# Patient Record
Sex: MALE | Race: WHITE | Employment: FULL TIME | URBAN - NONMETROPOLITAN AREA
[De-identification: names, ages, dates, MRNs, and addresses within clinical notes are randomized per-mention and may not be internally consistent; named-entity substitution may affect disease eponyms.]

---

## 2018-01-11 ENCOUNTER — HOSPITAL ENCOUNTER (EMERGENCY)
Age: 48
Discharge: HOME | End: 2018-01-12
Payer: COMMERCIAL

## 2018-01-11 DIAGNOSIS — R10.10 PAIN OF UPPER ABDOMEN: Primary | ICD-10-CM

## 2018-01-11 PROCEDURE — 99284 EMERGENCY DEPT VISIT MOD MDM: CPT

## 2018-01-12 ENCOUNTER — APPOINTMENT (OUTPATIENT)
Dept: CT IMAGING | Age: 48
End: 2018-01-12
Payer: COMMERCIAL

## 2018-01-12 VITALS
BODY MASS INDEX: 24.11 KG/M2 | OXYGEN SATURATION: 94 % | HEART RATE: 80 BPM | WEIGHT: 168 LBS | RESPIRATION RATE: 18 BRPM | SYSTOLIC BLOOD PRESSURE: 130 MMHG | DIASTOLIC BLOOD PRESSURE: 95 MMHG | TEMPERATURE: 98.2 F

## 2018-01-12 LAB
ALBUMIN SERPL-MCNC: 4.3 G/DL (ref 3.5–5.1)
ALP BLD-CCNC: 73 U/L (ref 38–126)
ALT SERPL-CCNC: 23 U/L (ref 11–66)
ANION GAP SERPL CALCULATED.3IONS-SCNC: 12 MEQ/L (ref 8–16)
AST SERPL-CCNC: 17 U/L (ref 5–40)
BACTERIA: NORMAL
BASOPHILS # BLD: 0.7 %
BASOPHILS ABSOLUTE: 0.1 THOU/MM3 (ref 0–0.1)
BILIRUB SERPL-MCNC: 0.4 MG/DL (ref 0.3–1.2)
BILIRUBIN URINE: NEGATIVE
BLOOD, URINE: NEGATIVE
BUN BLDV-MCNC: 9 MG/DL (ref 7–22)
CALCIUM SERPL-MCNC: 9 MG/DL (ref 8.5–10.5)
CASTS: NORMAL /LPF
CASTS: NORMAL /LPF
CHARACTER, URINE: CLEAR
CHLORIDE BLD-SCNC: 104 MEQ/L (ref 98–111)
CO2: 27 MEQ/L (ref 23–33)
COLOR: YELLOW
CREAT SERPL-MCNC: 0.8 MG/DL (ref 0.4–1.2)
CRYSTALS: NORMAL
EOSINOPHIL # BLD: 3.5 %
EOSINOPHILS ABSOLUTE: 0.3 THOU/MM3 (ref 0–0.4)
EPITHELIAL CELLS, UA: NORMAL /HPF
GFR SERPL CREATININE-BSD FRML MDRD: > 90 ML/MIN/1.73M2
GLUCOSE BLD-MCNC: 106 MG/DL (ref 70–108)
GLUCOSE, URINE: NEGATIVE MG/DL
HCT VFR BLD CALC: 44.7 % (ref 42–52)
HEMOGLOBIN: 15.3 GM/DL (ref 14–18)
KETONES, URINE: NEGATIVE
LACTIC ACID: 0.9 MMOL/L (ref 0.5–2.2)
LEUKOCYTE ESTERASE, URINE: NEGATIVE
LIPASE: 22.9 U/L (ref 5.6–51.3)
LYMPHOCYTES # BLD: 28.3 %
LYMPHOCYTES ABSOLUTE: 2.3 THOU/MM3 (ref 1–4.8)
MCH RBC QN AUTO: 30 PG (ref 27–31)
MCHC RBC AUTO-ENTMCNC: 34.1 GM/DL (ref 33–37)
MCV RBC AUTO: 87.9 FL (ref 80–94)
MISCELLANEOUS LAB TEST RESULT: NORMAL
MONOCYTES # BLD: 11.4 %
MONOCYTES ABSOLUTE: 0.9 THOU/MM3 (ref 0.4–1.3)
NITRITE, URINE: NEGATIVE
NUCLEATED RED BLOOD CELLS: 0 /100 WBC
OSMOLALITY CALCULATION: 284.1 MOSMOL/KG (ref 275–300)
PDW BLD-RTO: 13.9 % (ref 11.5–14.5)
PH UA: 6.5
PLATELET # BLD: 283 THOU/MM3 (ref 130–400)
PMV BLD AUTO: 8 MCM (ref 7.4–10.4)
POTASSIUM SERPL-SCNC: 3.8 MEQ/L (ref 3.5–5.2)
PROTEIN UA: NEGATIVE MG/DL
RBC # BLD: 5.09 MILL/MM3 (ref 4.7–6.1)
RBC URINE: NORMAL /HPF
RENAL EPITHELIAL, UA: NORMAL
SEG NEUTROPHILS: 56.1 %
SEGMENTED NEUTROPHILS ABSOLUTE COUNT: 4.6 THOU/MM3 (ref 1.8–7.7)
SODIUM BLD-SCNC: 143 MEQ/L (ref 135–145)
SPECIFIC GRAVITY UA: 1.02 (ref 1–1.03)
TOTAL PROTEIN: 6.9 G/DL (ref 6.1–8)
UROBILINOGEN, URINE: 1 EU/DL
WBC # BLD: 8.2 THOU/MM3 (ref 4.8–10.8)
WBC UA: NORMAL /HPF
YEAST: NORMAL

## 2018-01-12 PROCEDURE — 74177 CT ABD & PELVIS W/CONTRAST: CPT

## 2018-01-12 PROCEDURE — 81001 URINALYSIS AUTO W/SCOPE: CPT

## 2018-01-12 PROCEDURE — 83605 ASSAY OF LACTIC ACID: CPT

## 2018-01-12 PROCEDURE — 85025 COMPLETE CBC W/AUTO DIFF WBC: CPT

## 2018-01-12 PROCEDURE — 2580000003 HC RX 258: Performed by: PHYSICIAN ASSISTANT

## 2018-01-12 PROCEDURE — 36415 COLL VENOUS BLD VENIPUNCTURE: CPT

## 2018-01-12 PROCEDURE — 80053 COMPREHEN METABOLIC PANEL: CPT

## 2018-01-12 PROCEDURE — 6360000004 HC RX CONTRAST MEDICATION: Performed by: PHYSICIAN ASSISTANT

## 2018-01-12 PROCEDURE — 83690 ASSAY OF LIPASE: CPT

## 2018-01-12 RX ORDER — DICYCLOMINE HYDROCHLORIDE 10 MG/1
10 CAPSULE ORAL
Qty: 120 CAPSULE | Refills: 3 | Status: SHIPPED | OUTPATIENT
Start: 2018-01-12

## 2018-01-12 RX ORDER — 0.9 % SODIUM CHLORIDE 0.9 %
500 INTRAVENOUS SOLUTION INTRAVENOUS ONCE
Status: COMPLETED | OUTPATIENT
Start: 2018-01-12 | End: 2018-01-12

## 2018-01-12 RX ADMIN — IOPAMIDOL 80 ML: 755 INJECTION, SOLUTION INTRAVENOUS at 04:04

## 2018-01-12 RX ADMIN — SODIUM CHLORIDE 500 ML: 9 INJECTION, SOLUTION INTRAVENOUS at 02:21

## 2018-01-12 ASSESSMENT — ENCOUNTER SYMPTOMS
SORE THROAT: 0
SHORTNESS OF BREATH: 0
VOMITING: 0
ABDOMINAL PAIN: 1
DIARRHEA: 0
COUGH: 0
EYE REDNESS: 0
RHINORRHEA: 0
BACK PAIN: 0
WHEEZING: 0
EYE DISCHARGE: 0
NAUSEA: 0

## 2018-01-12 ASSESSMENT — PAIN DESCRIPTION - PAIN TYPE: TYPE: ACUTE PAIN

## 2018-01-12 ASSESSMENT — PAIN SCALES - GENERAL: PAINLEVEL_OUTOF10: 5

## 2018-01-12 NOTE — ED NOTES
First dose oral contrast drank without difficulty. Patient updated on plan of care.      Kei Bradford RN  01/12/18 3401

## 2018-01-21 PROBLEM — K43.9 VENTRAL HERNIA WITHOUT OBSTRUCTION OR GANGRENE: Status: ACTIVE | Noted: 2018-01-21

## 2018-01-21 PROBLEM — K42.9 UMBILICAL HERNIA WITHOUT OBSTRUCTION AND WITHOUT GANGRENE: Status: ACTIVE | Noted: 2018-01-21

## 2018-01-22 ENCOUNTER — TELEPHONE (OUTPATIENT)
Dept: SURGERY | Age: 48
End: 2018-01-22

## 2018-01-22 ENCOUNTER — OFFICE VISIT (OUTPATIENT)
Dept: SURGERY | Age: 48
End: 2018-01-22
Payer: COMMERCIAL

## 2018-01-22 VITALS
DIASTOLIC BLOOD PRESSURE: 64 MMHG | HEIGHT: 70 IN | HEART RATE: 76 BPM | RESPIRATION RATE: 18 BRPM | BODY MASS INDEX: 25.3 KG/M2 | TEMPERATURE: 97 F | WEIGHT: 176.7 LBS | SYSTOLIC BLOOD PRESSURE: 118 MMHG | OXYGEN SATURATION: 97 %

## 2018-01-22 DIAGNOSIS — K43.9 VENTRAL HERNIA WITHOUT OBSTRUCTION OR GANGRENE: ICD-10-CM

## 2018-01-22 DIAGNOSIS — K42.9 UMBILICAL HERNIA WITHOUT OBSTRUCTION AND WITHOUT GANGRENE: ICD-10-CM

## 2018-01-22 PROCEDURE — 99203 OFFICE O/P NEW LOW 30 MIN: CPT | Performed by: SURGERY

## 2018-01-22 RX ORDER — COVID-19 ANTIGEN TEST
1 KIT MISCELLANEOUS PRN
COMMUNITY

## 2018-01-22 ASSESSMENT — ENCOUNTER SYMPTOMS
FACIAL SWELLING: 0
CHEST TIGHTNESS: 0
BACK PAIN: 1
EYE DISCHARGE: 0
CONSTIPATION: 1
EYE ITCHING: 0
ANAL BLEEDING: 0
EYE PAIN: 0
NAUSEA: 0
STRIDOR: 0
SINUS PRESSURE: 0
RECTAL PAIN: 0
COUGH: 0
VOMITING: 0
EYE REDNESS: 0
TROUBLE SWALLOWING: 0
SORE THROAT: 0
APNEA: 0
DIARRHEA: 0
BLOOD IN STOOL: 0
PHOTOPHOBIA: 0
ABDOMINAL DISTENTION: 1
SHORTNESS OF BREATH: 0
VOICE CHANGE: 0
RHINORRHEA: 0
SINUS PAIN: 0
WHEEZING: 0
COLOR CHANGE: 0
ABDOMINAL PAIN: 1
CHOKING: 0

## 2018-01-22 NOTE — TELEPHONE ENCOUNTER
Scheduled Tod Sames for a ventral & umbilical hernia on Thurs 1/25 at 11:15 & he will arrive at 9:45. Tod Sames will be npo after midnight & consent was signed.

## 2018-01-22 NOTE — TELEPHONE ENCOUNTER
Per automated system at [facility] no pre-cert is required for CPT code [phone] & CPT code [phone] #[id number].

## 2018-01-22 NOTE — PROGRESS NOTES
Prior to Visit   Medication Sig Dispense Refill    dicyclomine (BENTYL) 10 MG capsule Take 1 capsule by mouth 4 times daily (before meals and nightly) 120 capsule 3     No current facility-administered medications on file prior to visit. Allergies  Allergies   Allergen Reactions    Beef-Derived Products Swelling    Motrin [Ibuprofen]      Can take aleve     Family History  Family History   Problem Relation Age of Onset    Hypertension Mother     Diabetes Mother     Cancer Mother      Breast    Heart Disease Mother     Cancer Sister      breast     Social History  Social History     Social History    Marital status: Single     Spouse name: N/A    Number of children: N/A    Years of education: N/A     Occupational History    Not on file. Social History Main Topics    Smoking status: Never Smoker    Smokeless tobacco: Current User     Types: Chew    Alcohol use Yes      Comment: 1-2 beers a night    Drug use: No    Sexual activity: Not on file     Other Topics Concern    Not on file     Social History Narrative    No narrative on file     [address] Maintenance   Topic Date Due    HIV screen  01/10/1985    DTaP/Tdap/Td vaccine (1 - Tdap) 01/10/1989    Flu vaccine (1) 09/01/2017    A1C test (Diabetic or Prediabetic)  01/17/2019    Lipid screen  01/17/2023     Review of Systems  Constitutional: Negative for activity change, appetite change, chills, diaphoresis, fatigue, fever and unexpected weight change. HENT: Negative for congestion, dental problem, drooling, ear discharge, ear pain, facial swelling, hearing loss, mouth sores, nosebleeds, postnasal drip, rhinorrhea, sinus pain, sinus pressure, sneezing, sore throat, tinnitus, trouble swallowing and voice change. Eyes: Negative for photophobia, pain, discharge, redness, itching and visual disturbance. Respiratory: Negative for apnea, cough, choking, chest tightness, shortness of breath, wheezing and stridor.

## 2018-01-22 NOTE — LETTER
[facility]  Jacquelyn Corado MD [facility]  Phone- [phone]  Fax [phone]    Pt Name: Noah Zamudio  Medical Record Number: [id number]  Date of Birth 1970   Today's Date: 1/22/2018    Moni Gaona was evaluated in the office today. My assessment and plans are listed below. Assessment:     Andres Jackson was seen today for surgical consult. Diagnoses and all orders for this visit:    Ventral hernia without obstruction or gangrene  -     REPAIR HERNIA VENTRAL / Sharia Jonnie; Future    Umbilical hernia without obstruction and without gangrene  -     REPAIR HERNIA VENTRAL / INCISIONAL; Future  -     REPAIR HERNIA UMBILICAL; Future  -     MN IMPLANT MESH HERNIA REPAIR/DEBRIDEMENT CLOSURE         Plan:     1. Schedule Joo for ventral and umbilical hernia repair  2. The risks, options and alternatives were discussed in the office. Bleeding, infection, reoperation and recurrence were discussed. Mesh infection specifically  was discussed, and the possible treatment options were discussed. The possibility of inadvertent injury to other structures was also covered. The patient was given opportunity to ask questions. Once answered, the patient has agreed to proceed with surgery. (OPEN)  3. The risks, options and alternatives were discussed in the office. Bleeding, infection, reoperation and recurrence were discussed. Injury to other intra abdominal structures and possible conversion to an open procedure as well as the risks of mesh infection were covered. The patient was given the opportunity to ask questions. Once answered, the patient was agreeable to proceed with the surgery. (L/S)  4. Status: outpatient  5. Planned anesthesia: MAC  6.  He will undergo pre-operative clearance per anesthesia guidelines with risk factors listed under the past medical history diagnosis & problem list.  7. Perioperative discontinuation of ASA, Plavix, warfarin, Brillinta, Effient, Pradaxa, Eliquis and Xarelto. Continuation of 81 mg Aspirin is acceptable. 8. Perioperative medical clearance is not required  Orders Placed This Encounter   Procedures    REPAIR HERNIA VENTRAL / INCISIONAL     Standing Status:   Future     Standing Expiration Date:   1/22/2019     Order Specific Question:   Pre-procedure Diagnosis     Answer:   INCISIONAL HERNIA REPAIR    REPAIR HERNIA UMBILICAL     Standing Status:   Future     Standing Expiration Date:   1/22/2019     Order Specific Question:   Pre-procedure Diagnosis     Answer:   UMBILICAL HERNIA    NV IMPLANT MESH HERNIA REPAIR/DEBRIDEMENT CLOSURE                   If I can provide any additional assistance or you have any concerns, please feel free to contact me. Thank you for allowing to participate in the care of your patients. Sincerely,      Anne Rutledge MD FACS  [address]  [address]  [place]  Office: [phone]  Fax: [phone]

## 2018-01-23 NOTE — PROGRESS NOTES
In preparation for their surgical procedure above patient was screened for Obstructive Sleep Apnea (ANGELLA) using the STOP-Bang Questionnaire by the Pre-Admission Testing department. This is a pre-surgical screening tool for patient safety and serves as a recommendation, this WILL NOT cause cancellation of surgery. STOP-Bang Questionnaire  * Do you currently see a pulmonologist?  No     If yes STOP, do not complete. Patient follows with  .    1. Do you snore loudly (able to be heard in the next room)? Yes    2. Do you often feel tired or sleepy during the daytime? No       3. Has anyone ever told you that you stop breathing during your sleep? No    4. Do you have or are you being treated for high blood pressure? No      5. BMI more than 35? BMI (Calculated): 25.3        No    6. Age over 48 years? 50 y.o. No    7. Neck Circumference greater than 17 inches for male or 16 inches for female? Measured           (visits only)            Not Applicable    8. Gender Male? Yes      TOTAL SCORE: 2    ANGELLA - Low Risk : Yes to 0 - 2 questions  ANGELLA - Intermediate Risk : Yes to 3 - 4 questions  ANGELLA - High Risk : Yes to 5 - 8 questions    Adapted from:   STOP Questionnaire: A Tool to Screen Patients for Obstructive Sleep Apnea   [facility] Arteaga[employer]P[facility]C., Monica Zamarripa M.B.B.S., Chago Chaparro M.D., Luis Alberto Palomares. Chris Thompson, Ph.D., ALVARO Reyes.B.B.S., Jose Solano, M.Sc., Stella Edmonds M.D., La Robertson. [facility] Kramer[employer]P[facility]    Anesthesiology 2008; [id number] Copyright 2008, the [address] of [employer], [place].   ----------------------------------------------------------------------------------------------------------------

## 2018-01-25 ENCOUNTER — APPOINTMENT (OUTPATIENT)
Dept: CT IMAGING | Age: 48
End: 2018-01-25
Payer: COMMERCIAL

## 2018-01-25 ENCOUNTER — ANESTHESIA (OUTPATIENT)
Dept: OPERATING ROOM | Age: 48
End: 2018-01-25
Payer: COMMERCIAL

## 2018-01-25 ENCOUNTER — HOSPITAL ENCOUNTER (OUTPATIENT)
Age: 48
Setting detail: OUTPATIENT SURGERY
Discharge: HOME | End: 2018-01-25
Attending: SURGERY | Admitting: SURGERY
Payer: COMMERCIAL

## 2018-01-25 ENCOUNTER — APPOINTMENT (OUTPATIENT)
Dept: GENERAL RADIOLOGY | Age: 48
End: 2018-01-25
Payer: COMMERCIAL

## 2018-01-25 ENCOUNTER — ANESTHESIA EVENT (OUTPATIENT)
Dept: OPERATING ROOM | Age: 48
End: 2018-01-25
Payer: COMMERCIAL

## 2018-01-25 ENCOUNTER — HOSPITAL ENCOUNTER (EMERGENCY)
Age: 48
Discharge: HOME | End: 2018-01-25
Attending: INTERNAL MEDICINE
Payer: COMMERCIAL

## 2018-01-25 VITALS
WEIGHT: 172 LBS | HEART RATE: 60 BPM | BODY MASS INDEX: 24.62 KG/M2 | SYSTOLIC BLOOD PRESSURE: 104 MMHG | RESPIRATION RATE: 16 BRPM | TEMPERATURE: 98.1 F | HEIGHT: 70 IN | OXYGEN SATURATION: 99 % | DIASTOLIC BLOOD PRESSURE: 58 MMHG

## 2018-01-25 VITALS
OXYGEN SATURATION: 95 % | RESPIRATION RATE: 20 BRPM | SYSTOLIC BLOOD PRESSURE: 96 MMHG | DIASTOLIC BLOOD PRESSURE: 62 MMHG

## 2018-01-25 VITALS
TEMPERATURE: 97.8 F | OXYGEN SATURATION: 97 % | WEIGHT: 162 LBS | HEIGHT: 70 IN | HEART RATE: 56 BPM | RESPIRATION RATE: 14 BRPM | DIASTOLIC BLOOD PRESSURE: 71 MMHG | BODY MASS INDEX: 23.19 KG/M2 | SYSTOLIC BLOOD PRESSURE: 101 MMHG

## 2018-01-25 DIAGNOSIS — K43.9 VENTRAL HERNIA WITHOUT OBSTRUCTION OR GANGRENE: ICD-10-CM

## 2018-01-25 DIAGNOSIS — Z98.890 S/P REPAIR OF VENTRAL HERNIA: Primary | ICD-10-CM

## 2018-01-25 DIAGNOSIS — Z87.19 S/P REPAIR OF VENTRAL HERNIA: Primary | ICD-10-CM

## 2018-01-25 DIAGNOSIS — R55 SYNCOPE, UNSPECIFIED SYNCOPE TYPE: Primary | ICD-10-CM

## 2018-01-25 LAB
ANION GAP SERPL CALCULATED.3IONS-SCNC: 14 MEQ/L (ref 8–16)
BASOPHILS # BLD: 0.8 %
BASOPHILS ABSOLUTE: 0.1 THOU/MM3 (ref 0–0.1)
BUN BLDV-MCNC: 8 MG/DL (ref 7–22)
CALCIUM SERPL-MCNC: 8.7 MG/DL (ref 8.5–10.5)
CHLORIDE BLD-SCNC: 103 MEQ/L (ref 98–111)
CO2: 24 MEQ/L (ref 23–33)
CREAT SERPL-MCNC: 0.9 MG/DL (ref 0.4–1.2)
EKG ATRIAL RATE: 54 BPM
EKG P AXIS: 21 DEGREES
EKG P-R INTERVAL: 122 MS
EKG Q-T INTERVAL: 448 MS
EKG QRS DURATION: 98 MS
EKG QTC CALCULATION (BAZETT): 424 MS
EKG R AXIS: 16 DEGREES
EKG T AXIS: 7 DEGREES
EKG VENTRICULAR RATE: 54 BPM
EOSINOPHIL # BLD: 3.7 %
EOSINOPHILS ABSOLUTE: 0.3 THOU/MM3 (ref 0–0.4)
GFR SERPL CREATININE-BSD FRML MDRD: > 90 ML/MIN/1.73M2
GLUCOSE BLD-MCNC: 108 MG/DL (ref 70–108)
GLUCOSE BLD-MCNC: 137 MG/DL (ref 70–108)
HCT VFR BLD CALC: 41.7 % (ref 42–52)
HEMOGLOBIN: 14.4 GM/DL (ref 14–18)
LYMPHOCYTES # BLD: 18.4 %
LYMPHOCYTES ABSOLUTE: 1.4 THOU/MM3 (ref 1–4.8)
MCH RBC QN AUTO: 30 PG (ref 27–31)
MCHC RBC AUTO-ENTMCNC: 34.6 GM/DL (ref 33–37)
MCV RBC AUTO: 86.7 FL (ref 80–94)
MONOCYTES # BLD: 10.9 %
MONOCYTES ABSOLUTE: 0.8 THOU/MM3 (ref 0.4–1.3)
NUCLEATED RED BLOOD CELLS: 0 /100 WBC
OSMOLALITY CALCULATION: 281.7 MOSMOL/KG (ref 275–300)
PDW BLD-RTO: 13.6 % (ref 11.5–14.5)
PLATELET # BLD: 267 THOU/MM3 (ref 130–400)
PMV BLD AUTO: 8.1 MCM (ref 7.4–10.4)
POTASSIUM SERPL-SCNC: 4 MEQ/L (ref 3.5–5.2)
RBC # BLD: 4.8 MILL/MM3 (ref 4.7–6.1)
SEG NEUTROPHILS: 66.2 %
SEGMENTED NEUTROPHILS ABSOLUTE COUNT: 5 THOU/MM3 (ref 1.8–7.7)
SODIUM BLD-SCNC: 141 MEQ/L (ref 135–145)
TROPONIN T: < 0.01 NG/ML
WBC # BLD: 7.5 THOU/MM3 (ref 4.8–10.8)

## 2018-01-25 PROCEDURE — 71046 X-RAY EXAM CHEST 2 VIEWS: CPT

## 2018-01-25 PROCEDURE — 3700000000 HC ANESTHESIA ATTENDED CARE: Performed by: SURGERY

## 2018-01-25 PROCEDURE — 2580000003 HC RX 258: Performed by: SURGERY

## 2018-01-25 PROCEDURE — 99284 EMERGENCY DEPT VISIT MOD MDM: CPT

## 2018-01-25 PROCEDURE — 49568 PR IMPLANT MESH HERNIA REPAIR/DEBRIDEMENT CLOSURE: CPT | Performed by: SURGERY

## 2018-01-25 PROCEDURE — 3600000003 HC SURGERY LEVEL 3 BASE: Performed by: SURGERY

## 2018-01-25 PROCEDURE — 84484 ASSAY OF TROPONIN QUANT: CPT

## 2018-01-25 PROCEDURE — 6360000002 HC RX W HCPCS: Performed by: NURSE ANESTHETIST, CERTIFIED REGISTERED

## 2018-01-25 PROCEDURE — 6360000002 HC RX W HCPCS: Performed by: SURGERY

## 2018-01-25 PROCEDURE — 7100000010 HC PHASE II RECOVERY - FIRST 15 MIN: Performed by: SURGERY

## 2018-01-25 PROCEDURE — 6370000000 HC RX 637 (ALT 250 FOR IP): Performed by: SURGERY

## 2018-01-25 PROCEDURE — 3600000013 HC SURGERY LEVEL 3 ADDTL 15MIN: Performed by: SURGERY

## 2018-01-25 PROCEDURE — 36415 COLL VENOUS BLD VENIPUNCTURE: CPT

## 2018-01-25 PROCEDURE — 82948 REAGENT STRIP/BLOOD GLUCOSE: CPT

## 2018-01-25 PROCEDURE — 70450 CT HEAD/BRAIN W/O DYE: CPT

## 2018-01-25 PROCEDURE — 49587 REPAIR UMBILICAL HERN,5+Y/O,STRANG: CPT | Performed by: SURGERY

## 2018-01-25 PROCEDURE — 2580000003 HC RX 258: Performed by: INTERNAL MEDICINE

## 2018-01-25 PROCEDURE — 93005 ELECTROCARDIOGRAM TRACING: CPT

## 2018-01-25 PROCEDURE — 7100000011 HC PHASE II RECOVERY - ADDTL 15 MIN: Performed by: SURGERY

## 2018-01-25 PROCEDURE — 49560 PR REPAIR INCISIONAL HERNIA,REDUCIBLE: CPT | Performed by: SURGERY

## 2018-01-25 PROCEDURE — 6360000002 HC RX W HCPCS

## 2018-01-25 PROCEDURE — 2500000003 HC RX 250 WO HCPCS: Performed by: SURGERY

## 2018-01-25 PROCEDURE — 85025 COMPLETE CBC W/AUTO DIFF WBC: CPT

## 2018-01-25 PROCEDURE — C1781 MESH (IMPLANTABLE): HCPCS | Performed by: SURGERY

## 2018-01-25 PROCEDURE — 80048 BASIC METABOLIC PNL TOTAL CA: CPT

## 2018-01-25 PROCEDURE — 3700000001 HC ADD 15 MINUTES (ANESTHESIA): Performed by: SURGERY

## 2018-01-25 DEVICE — PATCH HERN M DIA2.5IN CIR W/ STRP SEPRA TECHNOLOGY ABSRB: Type: IMPLANTABLE DEVICE | Status: FUNCTIONAL

## 2018-01-25 DEVICE — IMPLANTABLE DEVICE: Type: IMPLANTABLE DEVICE | Status: FUNCTIONAL

## 2018-01-25 RX ORDER — MORPHINE SULFATE 2 MG/ML
4 INJECTION, SOLUTION INTRAMUSCULAR; INTRAVENOUS
Status: DISCONTINUED | OUTPATIENT
Start: 2018-01-25 | End: 2018-01-25 | Stop reason: HOSPADM

## 2018-01-25 RX ORDER — HYDROCODONE BITARTRATE AND ACETAMINOPHEN 5; 325 MG/1; MG/1
2 TABLET ORAL EVERY 4 HOURS PRN
Status: DISCONTINUED | OUTPATIENT
Start: 2018-01-25 | End: 2018-01-25 | Stop reason: HOSPADM

## 2018-01-25 RX ORDER — SODIUM CHLORIDE 0.9 % (FLUSH) 0.9 %
10 SYRINGE (ML) INJECTION PRN
Status: DISCONTINUED | OUTPATIENT
Start: 2018-01-25 | End: 2018-01-25 | Stop reason: HOSPADM

## 2018-01-25 RX ORDER — ACETAMINOPHEN 325 MG/1
650 TABLET ORAL EVERY 4 HOURS PRN
Status: DISCONTINUED | OUTPATIENT
Start: 2018-01-25 | End: 2018-01-25 | Stop reason: HOSPADM

## 2018-01-25 RX ORDER — ONDANSETRON 2 MG/ML
4 INJECTION INTRAMUSCULAR; INTRAVENOUS EVERY 6 HOURS PRN
Status: DISCONTINUED | OUTPATIENT
Start: 2018-01-25 | End: 2018-01-25 | Stop reason: HOSPADM

## 2018-01-25 RX ORDER — MIDAZOLAM HYDROCHLORIDE 1 MG/ML
INJECTION INTRAMUSCULAR; INTRAVENOUS PRN
Status: DISCONTINUED | OUTPATIENT
Start: 2018-01-25 | End: 2018-01-25 | Stop reason: SDUPTHER

## 2018-01-25 RX ORDER — MORPHINE SULFATE 2 MG/ML
2 INJECTION, SOLUTION INTRAMUSCULAR; INTRAVENOUS
Status: DISCONTINUED | OUTPATIENT
Start: 2018-01-25 | End: 2018-01-25 | Stop reason: HOSPADM

## 2018-01-25 RX ORDER — HYDROCODONE BITARTRATE AND ACETAMINOPHEN 5; 325 MG/1; MG/1
1 TABLET ORAL EVERY 4 HOURS PRN
Status: DISCONTINUED | OUTPATIENT
Start: 2018-01-25 | End: 2018-01-25 | Stop reason: HOSPADM

## 2018-01-25 RX ORDER — PROPOFOL 10 MG/ML
INJECTION, EMULSION INTRAVENOUS PRN
Status: DISCONTINUED | OUTPATIENT
Start: 2018-01-25 | End: 2018-01-25 | Stop reason: SDUPTHER

## 2018-01-25 RX ORDER — FENTANYL CITRATE 50 UG/ML
INJECTION, SOLUTION INTRAMUSCULAR; INTRAVENOUS PRN
Status: DISCONTINUED | OUTPATIENT
Start: 2018-01-25 | End: 2018-01-25 | Stop reason: SDUPTHER

## 2018-01-25 RX ORDER — SODIUM CHLORIDE 9 MG/ML
INJECTION, SOLUTION INTRAVENOUS CONTINUOUS
Status: DISCONTINUED | OUTPATIENT
Start: 2018-01-25 | End: 2018-01-25 | Stop reason: HOSPADM

## 2018-01-25 RX ORDER — HYDROCODONE BITARTRATE AND ACETAMINOPHEN 5; 325 MG/1; MG/1
1 TABLET ORAL EVERY 4 HOURS PRN
Qty: 42 TABLET | Refills: 0 | Status: SHIPPED | OUTPATIENT
Start: 2018-01-25 | End: 2018-02-01

## 2018-01-25 RX ORDER — SODIUM CHLORIDE 0.9 % (FLUSH) 0.9 %
10 SYRINGE (ML) INJECTION EVERY 12 HOURS SCHEDULED
Status: DISCONTINUED | OUTPATIENT
Start: 2018-01-25 | End: 2018-01-25 | Stop reason: HOSPADM

## 2018-01-25 RX ORDER — 0.9 % SODIUM CHLORIDE 0.9 %
1000 INTRAVENOUS SOLUTION INTRAVENOUS ONCE
Status: COMPLETED | OUTPATIENT
Start: 2018-01-25 | End: 2018-01-25

## 2018-01-25 RX ADMIN — PROPOFOL 50 MG: 10 INJECTION, EMULSION INTRAVENOUS at 10:42

## 2018-01-25 RX ADMIN — PROPOFOL 20 MG: 10 INJECTION, EMULSION INTRAVENOUS at 10:58

## 2018-01-25 RX ADMIN — PROPOFOL 10 MG: 10 INJECTION, EMULSION INTRAVENOUS at 10:57

## 2018-01-25 RX ADMIN — FENTANYL CITRATE 50 MCG: 50 INJECTION INTRAMUSCULAR; INTRAVENOUS at 10:52

## 2018-01-25 RX ADMIN — HYDROCODONE BITARTRATE AND ACETAMINOPHEN 2 TABLET: 5; 325 TABLET ORAL at 11:56

## 2018-01-25 RX ADMIN — PROPOFOL 20 MG: 10 INJECTION, EMULSION INTRAVENOUS at 10:46

## 2018-01-25 RX ADMIN — SODIUM CHLORIDE 1000 ML: 9 INJECTION, SOLUTION INTRAVENOUS at 15:00

## 2018-01-25 RX ADMIN — SODIUM CHLORIDE: 9 INJECTION, SOLUTION INTRAVENOUS at 10:24

## 2018-01-25 RX ADMIN — PROPOFOL 20 MG: 10 INJECTION, EMULSION INTRAVENOUS at 10:52

## 2018-01-25 RX ADMIN — CEFAZOLIN SODIUM 2 G: 2 SOLUTION INTRAVENOUS at 10:41

## 2018-01-25 RX ADMIN — MIDAZOLAM HYDROCHLORIDE 2 MG: 1 INJECTION, SOLUTION INTRAMUSCULAR; INTRAVENOUS at 10:35

## 2018-01-25 RX ADMIN — FENTANYL CITRATE 50 MCG: 50 INJECTION INTRAMUSCULAR; INTRAVENOUS at 10:40

## 2018-01-25 RX ADMIN — PROPOFOL 20 MG: 10 INJECTION, EMULSION INTRAVENOUS at 10:49

## 2018-01-25 ASSESSMENT — PULMONARY FUNCTION TESTS
PIF_VALUE: 0

## 2018-01-25 ASSESSMENT — PAIN DESCRIPTION - PAIN TYPE
TYPE: ACUTE PAIN
TYPE: SURGICAL PAIN
TYPE: SURGICAL PAIN

## 2018-01-25 ASSESSMENT — ENCOUNTER SYMPTOMS
EYE REDNESS: 0
SHORTNESS OF BREATH: 0
EYE DISCHARGE: 0
DIARRHEA: 0
VOMITING: 0
ABDOMINAL PAIN: 1
NAUSEA: 0
COUGH: 0
SORE THROAT: 0
RHINORRHEA: 0
BACK PAIN: 0
WHEEZING: 0

## 2018-01-25 ASSESSMENT — PAIN DESCRIPTION - ORIENTATION
ORIENTATION: MID

## 2018-01-25 ASSESSMENT — PAIN DESCRIPTION - LOCATION
LOCATION: ABDOMEN

## 2018-01-25 ASSESSMENT — PAIN DESCRIPTION - DESCRIPTORS
DESCRIPTORS: BURNING;SORE
DESCRIPTORS: BURNING;SORE
DESCRIPTORS: ACHING
DESCRIPTORS: BURNING;SORE

## 2018-01-25 ASSESSMENT — PAIN SCALES - GENERAL
PAINLEVEL_OUTOF10: 2
PAINLEVEL_OUTOF10: 7
PAINLEVEL_OUTOF10: 6
PAINLEVEL_OUTOF10: 7
PAINLEVEL_OUTOF10: 5
PAINLEVEL_OUTOF10: 3

## 2018-01-25 ASSESSMENT — PAIN DESCRIPTION - PROGRESSION: CLINICAL_PROGRESSION: GRADUALLY IMPROVING

## 2018-01-25 ASSESSMENT — PAIN DESCRIPTION - FREQUENCY: FREQUENCY: CONTINUOUS

## 2018-01-25 ASSESSMENT — PAIN DESCRIPTION - ONSET: ONSET: OTHER (COMMENT)

## 2018-01-25 NOTE — H&P
[facility]  History and Physical Update    Pt Name: Jillian Pretty  MRN: [id number]  YOB: 1970  Date of evaluation: 1/25/2018    [x] I have examined the patient and reviewed the H&P/Consult and there are no changes to the patient or plans.     [] I have examined the patient and reviewed the H&P/Consult and have noted the following changes:        Atiya Sr  Electronically signed 1/25/2018 at 10:09 AM

## 2018-01-25 NOTE — ANESTHESIA PRE PROCEDURE
Department of Anesthesiology  Preprocedure Note       Name:  Darrius Wiggins   Age:  50 y.o.  :  1970                                          MRN:  [id number]         Date:  2018      Surgeon: Isaiah Dodge):  Natalia Marsh MD    Procedure: Procedure(s):  VENTRAL HERNIA  AND UMBILICAL HERNIA REPAIR    Medications prior to admission:   Prior to Admission medications    Medication Sig Start Date End Date Taking? Authorizing Provider   Lactobacillus (ACIDOPHILUS PO) Take by mouth every morning   Yes Historical Provider, MD   Multiple Vitamin (MULTI-VITAMIN PO) Take by mouth every morning   Yes Historical Provider, MD   Naproxen Sodium (ALEVE) 220 MG CAPS Take 1 capsule by mouth as needed for Pain   Yes Historical Provider, MD   dicyclomine (BENTYL) 10 MG capsule Take 1 capsule by mouth 4 times daily (before meals and nightly) 18  Yes JAZ Calle       Current medications:    Current Facility-Administered Medications   Medication Dose Route Frequency Provider Last Rate Last Dose    0.9 % sodium chloride infusion   Intravenous Continuous Natalia Marsh MD        sodium chloride flush 0.9 % injection 10 mL  10 mL Intravenous 2 times per day Natalia Marsh MD        sodium chloride flush 0.9 % injection 10 mL  10 mL Intravenous PRN Natalia Marsh MD        ceFAZolin (ANCEF) 2 g in sterile water 20 mL IV syringe  2 g Intravenous On Call to MD Annalise           Allergies:     Allergies   Allergen Reactions    Beef-Derived Products Swelling    Motrin [Ibuprofen]      Cannot take due to glucose 6 phosphate deficiency, can take aleve    Other      Sodium Pentothal caused patient to \"become pale and passed out\"       Problem List:    Patient Active Problem List   Diagnosis Code    Ventral hernia without obstruction or gangrene X61.3    Umbilical hernia without obstruction and without gangrene K42.9       Past Medical History:        Diagnosis Date    DDD (degenerative disc disease), lumbar     G6PD deficiency ([facility] [address]     History of kidney stones     Wears dentures     Wears glasses        Past Surgical History:        Procedure Laterality Date    DENTAL SURGERY      full mouth extraction    KNEE SURGERY Right     x2-Dr Ana Luisa Mccarthyather Left     Dr Governor Boudreaux    OTHER SURGICAL HISTORY      Laceration repair of left tear duct-as a child    TONSILLECTOMY      as a child    TYMPANOSTOMY TUBE PLACEMENT      as a child       Social History:    Social History   Substance Use Topics    Smoking status: Never Smoker    Smokeless tobacco: Current User     Types: Chew    Alcohol use Yes      Comment: 1-2 beers a night                                Ready to quit: Not Answered  Counseling given: Not Answered      Vital Signs (Current):   Vitals:    01/23/18 1005 01/25/18 1003 01/25/18 1008   BP:  111/74    Pulse:  80    Resp:  16    Temp:  97.7 °F (36.5 °C)    TempSrc:  Temporal    SpO2:  95%    Weight: 176 lb (79.8 kg)  172 lb (78 kg)   Height: 5' 10\" (1.778 m)  5' 10\" (1.778 m)                                              BP Readings from Last 3 Encounters:   01/25/18 111/74   01/22/18 118/64   01/12/18 (!) 130/95       NPO Status: Time of last liquid consumption: 2100                        Time of last solid consumption: 2100                        Date of last liquid consumption: 01/24/18                        Date of last solid food consumption: 01/24/18    BMI:   Wt Readings from Last 3 Encounters:   01/25/18 172 lb (78 kg)   01/22/18 176 lb 11.2 oz (80.2 kg)   01/11/18 168 lb (76.2 kg)     Body mass index is 24.68 kg/m².     CBC:   Lab Results   Component Value Date    WBC 8.2 01/12/2018    RBC 5.09 01/12/2018    HGB 15.3 01/12/2018    HCT 44.7 01/12/2018    MCV 87.9 01/12/2018    RDW 13.9 01/12/2018     01/12/2018       CMP:   Lab Results   Component Value Date     01/12/2018    K 3.8 01/12/2018     01/12/2018    CO2 27 01/12/2018    BUN 9 01/12/2018    CREATININE 0.8 01/12/2018    LABGLOM >90 01/12/2018    GLUCOSE 106 01/12/2018    PROT 6.9 01/12/2018    CALCIUM 9.0 01/12/2018    BILITOT 0.4 01/12/2018    ALKPHOS 73 01/12/2018    AST 26 01/17/2018    ALT 26 01/17/2018       POC Tests: No results for input(s): POCGLU, POCNA, POCK, POCCL, POCBUN, POCHEMO, POCHCT in the last 72 hours. Coags: No results found for: PROTIME, INR, APTT    HCG (If Applicable): No results found for: PREGTESTUR, PREGSERUM, HCG, HCGQUANT     ABGs: No results found for: PHART, PO2ART, WZO8LHY, FLZ0WNZ, BEART, E7QAXQFN     Type & Screen (If Applicable):  No results found for: LABABO, LABRH    Anesthesia Evaluation    Airway: Mallampati: II       Mouth opening: > = 3 FB Dental:          Pulmonary:       (-) COPD                           Cardiovascular:        (-) hypertension      Rhythm: regular                      Neuro/Psych:               GI/Hepatic/Renal:            ROS comment: G6PD deficiency. .   Endo/Other:                     Abdominal:           Vascular:                                        Anesthesia Plan      MAC     ASA 2             Anesthetic plan and risks discussed with patient. Plan discussed with CRNA.                   Elle Gregg MD   1/25/2018

## 2018-01-25 NOTE — ANESTHESIA POSTPROCEDURE EVALUATION
Department of Anesthesiology  Postprocedure Note    Patient: Efrain Tran  MRN: [id number]  YOB: 1970  Date of evaluation: 1/25/2018  Time:  1:45 PM     Procedure Summary     Date:  01/25/18 Room / Location:  [place] [facility] [place] [facility]    Anesthesia Start:  6047 Anesthesia Stop:  1114    Procedure:  VENTRAL HERNIA  AND UMBILICAL HERNIA REPAIR WITH MESH (N/A Abdomen) Diagnosis:  (VENTRAL HERNIA AND UMBILICAL HERNIA)    Surgeon:  Jaylen Stewart MD Responsible Provider:  Elia Farmer MD    Anesthesia Type:  MAC ASA Status:  2          Anesthesia Type: No value filed. Osvaldo Phase I: Osvaldo Score: 10    Osvaldo Phase II: Osvaldo Score: 10    Last vitals: Reviewed and per EMR flowsheets. Anesthesia Post Evaluation   [address]  POST-ANESTHESIA NOTE       Name:  Efrain Tran                                         Age:  50 y.o.   MRN:  [id number]      Last Vitals:  BP (!) 104/58   Pulse 60   Temp 98.1 °F (36.7 °C) (Temporal)   Resp 16   Ht 5' 10\" (1.778 m)   Wt 172 lb (78 kg)   SpO2 99%   BMI 24.68 kg/m²   Patient Vitals for the past 4 hrs:   BP Temp Temp src Pulse Resp SpO2 Height Weight   01/25/18 1233 (!) 104/58 - - 60 16 99 % - -   01/25/18 1156 102/64 - - 60 16 98 % - -   01/25/18 1122 119/75 98.1 °F (36.7 °C) Temporal 62 16 100 % - -   01/25/18 1008 - - - - - - 5' 10\" (1.778 m) 172 lb (78 kg)   01/25/18 1003 111/74 97.7 °F (36.5 °C) Temporal 80 16 95 % - -       Level of Consciousness:  Awake    Respiratory:  Stable    Oxygen Saturation:  Stable    Cardiovascular:  Stable    Hydration:  Adequate    PONV:  Stable    Post-op Pain:  Adequate analgesia    Post-op Assessment:  No apparent anesthetic complications    Additional Follow-Up / Treatment / Comment:  None    Elia Farmer MD  January 25, 2018   1:45 PM

## 2018-01-25 NOTE — ED PROVIDER NOTES
laboratory and radiology findings, as well as their stable and non-emergent condition. Discussed the plan to discharge. I instructed the patient to follow up with Dr. Remi Rodriguez and Dr. Michael Chandra as needed, and to return to the emergency department for any new or worsening symptoms, specifically including recurrent syncope or signs of infection to his hernia repair site, including redness, swelling, warmth to touch, or discharge. The patient understands and agrees with this plan. All questions were addressed. MDM:  The patient was seen and evaluated for syncope just post hernia repair. Appropriate laboratory and radiology tests were ordered. Laboratory results notably showed a negative troponin. His CT head and chest x-ray both showed no acute findings. At bedside, the patient did not require medication. Based on these findings, as well as their stable and non-emergent condition, I will discharge the patient. Discussed the plan to discharge. I instructed the patient to follow up with Dr. Remi Rodriguez and Dr. Michael Chandra as needed, and to return to the emergency department for any new or worsening symptoms, specifically including recurrent syncope or signs of infection to his hernia repair site, including redness, swelling, warmth to touch, or discharge. The patient understands and agrees with this plan. All questions were addressed. CRITICAL CARE:   None    CONSULTS:  Dr. Michael Chandra (surgeon)    PROCEDURES:  None     FINAL IMPRESSION      1. Syncope, unspecified syncope type          DISPOSITION/PLAN   Discharge    PATIENT REFERRED TO:  Martha Allen MD  [address] [place]  [phone]    Go in 1 day        DISCHARGE MEDICATIONS:  New Prescriptions    No medications on file       (Please note that portions of this note were completed with a voice recognition program.  Efforts were made to edit the dictations but occasionally words are mis-transcribed.)    Scribe:   Fito Jarvis 1/25/18 2:06 PM Scribing for and in the presence of Cleo Masterson MD.    Signed by: Trisha Castillo, 01/25/18 4:10 PM    Provider:  I personally performed the services described in the documentation, reviewed and edited the documentation which was dictated to the scribe in my presence, and it accurately records my words and actions.     Cleo Masterson MD 1/25/18 4:10 PM                          Cleo Masterson MD  01/25/18 8427

## 2018-01-25 NOTE — PROGRESS NOTES
Discharge instructions given and pt. Verbalizes understanding. Discharged per wheelchair, home with mother.

## 2018-01-26 ENCOUNTER — TELEPHONE (OUTPATIENT)
Dept: SURGERY | Age: 48
End: 2018-01-26

## 2018-01-29 ENCOUNTER — TELEPHONE (OUTPATIENT)
Dept: SURGERY | Age: 48
End: 2018-01-29

## 2018-01-29 RX ORDER — CEPHALEXIN 250 MG/1
500 CAPSULE ORAL 4 TIMES DAILY
Qty: 28 CAPSULE | Refills: 0 | Status: SHIPPED | OUTPATIENT
Start: 2018-01-29 | End: 2018-02-05

## 2018-02-08 NOTE — PROGRESS NOTES
Xiomara Taveras MD FACS  General Surgery  Postprocedure Evaluation in Office  Pt Name: Anupam Leary  Date of Birth 1970   Today's Date: 2/12/2018  Medical Record Number: [id number]  Referring Provider: No ref. provider found  Primary Care Provider: Valeta Romberg, MD  Chief Complaint   Patient presents with    Post-Op Check     s/p Umbilical Hernia Repair With Mesh 1/25/18     ASSESSMENT      Problem List Items Addressed This Visit     S/P repair of ventral hernia - Primary      Other Visit Diagnoses    None. PLANS       Pathology reviewed with the patient who understands. All questions were answered. New Prescriptions    No medications on file     Patient Instructions   May return to full activity without restrictions. Follow up: Return if symptoms worsen or fail to improve. Orders Placed This Encounter:  No orders of the defined types were placed in this encounter. Ashu Bass is seen today for post-op follow-up. He is 2 week(s) status post open ventral hernia and umbilical hernia repair  repair . He is tolerating a regular diet, having regular bowel movements. Symptoms and activity have gradually improved compared to preoperative. The surgical site is clean and has no drainage. Pain is controlled without any medications. . He has compliant with postoperative instructions.   Past Medical History  Past Medical History:   Diagnosis Date    DDD (degenerative disc disease), lumbar     G6PD deficiency ([facility] [address]     History of kidney stones     Wears dentures     Wears glasses      Past Surgical History  Past Surgical History:   Procedure Laterality Date    DENTAL SURGERY      full mouth extraction    KNEE SURGERY Right     x2-Dr Ashwini Moran Left     Dr Catrachita Choi    OTHER SURGICAL HISTORY      Laceration repair of left tear duct-as a child    AK REPAIR INCISIONAL HERNIA,REDUCIBLE N/A 1/25/2018    VENTRAL HERNIA  AND UMBILICAL HERNIA REPAIR WITH

## 2018-02-12 ENCOUNTER — OFFICE VISIT (OUTPATIENT)
Dept: SURGERY | Age: 48
End: 2018-02-12

## 2018-02-12 VITALS
BODY MASS INDEX: 23.91 KG/M2 | OXYGEN SATURATION: 98 % | DIASTOLIC BLOOD PRESSURE: 62 MMHG | WEIGHT: 167 LBS | TEMPERATURE: 97.1 F | HEIGHT: 70 IN | RESPIRATION RATE: 18 BRPM | HEART RATE: 64 BPM | SYSTOLIC BLOOD PRESSURE: 110 MMHG

## 2018-02-12 DIAGNOSIS — Z98.890 S/P REPAIR OF VENTRAL HERNIA: Primary | ICD-10-CM

## 2018-02-12 DIAGNOSIS — Z87.19 S/P REPAIR OF VENTRAL HERNIA: Primary | ICD-10-CM

## 2018-02-12 PROCEDURE — 99024 POSTOP FOLLOW-UP VISIT: CPT | Performed by: SURGERY

## 2018-02-12 NOTE — LETTER
[facility]  [facility] [address]  Phone: [phone]  Fax: [phone]    Xiomara Taveras MD        February 12, 2018     Patient: Anupam Leary   YOB: 1970   Date of Visit: 2/12/2018       To Whom It May Concern: It is my medical opinion that Rosalia Henson may return to work on 2/19/2018 with no restrictions. If you have any questions or concerns, please don't hesitate to call.     Sincerely,          Xiomara Taveras MD

## 2020-03-27 ENCOUNTER — HOSPITAL ENCOUNTER (EMERGENCY)
Age: 50
Discharge: HOME | End: 2020-03-27
Attending: EMERGENCY MEDICINE
Payer: COMMERCIAL

## 2020-03-27 VITALS
HEIGHT: 70 IN | BODY MASS INDEX: 24.77 KG/M2 | DIASTOLIC BLOOD PRESSURE: 87 MMHG | HEART RATE: 83 BPM | SYSTOLIC BLOOD PRESSURE: 131 MMHG | RESPIRATION RATE: 18 BRPM | TEMPERATURE: 97.7 F | OXYGEN SATURATION: 98 % | WEIGHT: 173 LBS

## 2020-03-27 LAB
FLU A ANTIGEN: NEGATIVE
FLU B ANTIGEN: NEGATIVE
GROUP A STREP CULTURE, REFLEX: NEGATIVE
REFLEX THROAT C + S: NORMAL

## 2020-03-27 PROCEDURE — 99281 EMR DPT VST MAYX REQ PHY/QHP: CPT

## 2020-03-27 PROCEDURE — 87070 CULTURE OTHR SPECIMN AEROBIC: CPT

## 2020-03-27 PROCEDURE — 87880 STREP A ASSAY W/OPTIC: CPT

## 2020-03-27 PROCEDURE — 87804 INFLUENZA ASSAY W/OPTIC: CPT

## 2020-03-27 RX ORDER — ALBUTEROL SULFATE 90 UG/1
1 AEROSOL, METERED RESPIRATORY (INHALATION)
COMMUNITY
Start: 2020-03-26

## 2020-03-27 ASSESSMENT — PAIN DESCRIPTION - INTENSITY: RATING_2: 4

## 2020-03-27 ASSESSMENT — PAIN DESCRIPTION - PAIN TYPE
TYPE: ACUTE PAIN
TYPE_2: ACUTE PAIN

## 2020-03-27 ASSESSMENT — PAIN DESCRIPTION - LOCATION
LOCATION: THROAT
LOCATION_2: GENERALIZED

## 2020-03-27 ASSESSMENT — ENCOUNTER SYMPTOMS
VOMITING: 0
RHINORRHEA: 1
EYE REDNESS: 0
NAUSEA: 0
SORE THROAT: 1
SHORTNESS OF BREATH: 0
DIARRHEA: 0
ABDOMINAL PAIN: 0
COUGH: 1

## 2020-03-27 ASSESSMENT — PAIN SCALES - GENERAL: PAINLEVEL_OUTOF10: 6

## 2020-03-27 ASSESSMENT — PAIN DESCRIPTION - DESCRIPTORS: DESCRIPTORS_2: ACHING

## 2020-03-27 NOTE — ED PROVIDER NOTES
disease), lumbar     G6PD deficiency     History of kidney stones     Wears dentures     Wears glasses        SURGICALHISTORY      has a past surgical history that includes knee surgery (Right); knee surgery (Left); Tonsillectomy; Tympanostomy tube placement; other surgical history; Dental surgery; and pr repair incisional hernia,reducible (N/A, 2018). CURRENT MEDICATIONS       Discharge Medication List as of 3/27/2020 10:37 AM      CONTINUE these medications which have NOT CHANGED    Details   Multiple Vitamin (MULTI-VITAMIN PO) Take by mouth every morningHistorical Med      Naproxen Sodium (ALEVE) 220 MG CAPS Take 1 capsule by mouth as needed for PainHistorical Med      dicyclomine (BENTYL) 10 MG capsule Take 1 capsule by mouth 4 times daily (before meals and nightly), Disp-120 capsule, R-3Print      albuterol sulfate  (90 Base) MCG/ACT inhaler Inhale 1 puff into the lungs every 3-4 hours as neededHistorical Med             ALLERGIES     is allergic to beef-derived products; motrin [ibuprofen]; other; and bee venom. FAMILY HISTORY     He indicated that his mother is alive. He indicated that his father is . He indicated that all of his three sisters are alive. He indicated that his brother is alive. family history includes Cancer in his mother and sister; Diabetes in his mother; Heart Disease in his mother; Hypertension in his mother.     SOCIAL HISTORY       Social History     Socioeconomic History    Marital status: Single     Spouse name: Not on file    Number of children: Not on file    Years of education: Not on file    Highest education level: Not on file   Occupational History    Not on file   Social Needs    Financial resource strain: Not on file    Food insecurity     Worry: Not on file     Inability: Not on file    Transportation needs     Medical: Not on file     Non-medical: Not on file   Tobacco Use    Smoking status: Never Smoker    Smokeless tobacco: Current He is advised to continue taking the medicines he is Zion been prescribed, return the emergency department new or worsening signs and symptoms. Stay away from other people as they can be very illnesses that can make him more sick. He verbalized understanding plan of care. Medications - No data to display    Patient was seenindependently by myself. The patient's final impression and disposition and plan was determined by myself. CRITICAL CARE:   None    CONSULTS:  None    PROCEDURES:  None    FINAL IMPRESSION     1. Viral URI with cough    2. Streptococcal sore throat          DISPOSITION/PLAN   Patient discharged in stable condition  PATIENT REFERREDTO:  Leobardo Calero, [address] [facility]  [phone]    Call in 1 week  If symptoms worsen or fail to improve      DISCHARGE MEDICATIONS:  Discharge Medication List as of 3/27/2020 10:37 AM          (Please note that portions of this note were completed with a voice recognition program.  Efforts were made to edit the dictations but occasionally words are mis-transcribed.)    Scribe:  By signing my name below, Hannah Dumont, bubba that this documentation has been prepared under the direction and in the presence of Jose Jha CNP. Electronically Signed: Trisha Liz, 3/27/20, 9:37 AM EDT. Provider:  I personally performed the services described in the documentation,reviewed and edited the documentation which was dictated to the scribe in my presence, and it accurately records my words and actions. Jose Jha CNP 3/27/20, 9:37 AM EDT.      FRED Xiong - JUAN JOSÉ        [employer], APRN - CNP  03/27/20 2327

## 2020-03-27 NOTE — ED TRIAGE NOTES
Pt presents to ER with congestion, sore throat that both started yesterday, and generalized body aches that started today. Pain in his throat is rated 6 out of 10, generalized body aches are rated 4 out of 10. Denies fever, shortness of breath, or chest pain. Pt swabbed for flu and strep.

## 2020-03-29 LAB — THROAT/NOSE CULTURE: NORMAL

## 2022-02-10 ENCOUNTER — HOSPITAL ENCOUNTER (OUTPATIENT)
Dept: GENERAL RADIOLOGY | Age: 52
Discharge: HOME | End: 2022-02-10
Payer: COMMERCIAL

## 2022-02-10 ENCOUNTER — HOSPITAL ENCOUNTER (OUTPATIENT)
Age: 52
Discharge: HOME | End: 2022-02-10
Payer: COMMERCIAL

## 2022-02-10 DIAGNOSIS — R52 PAIN: ICD-10-CM

## 2022-02-10 PROCEDURE — 72100 X-RAY EXAM L-S SPINE 2/3 VWS: CPT

## 2022-02-10 PROCEDURE — 73521 X-RAY EXAM HIPS BI 2 VIEWS: CPT

## 2022-03-21 ENCOUNTER — HOSPITAL ENCOUNTER (OUTPATIENT)
Dept: ULTRASOUND IMAGING | Age: 52
Discharge: HOME | End: 2022-03-21
Payer: COMMERCIAL

## 2022-03-21 DIAGNOSIS — R22.1 NECK MASS: ICD-10-CM

## 2022-03-21 PROCEDURE — 76536 US EXAM OF HEAD AND NECK: CPT

## (undated) DEVICE — BREAST HERNIA PACK: Brand: MEDLINE INDUSTRIES, INC.

## (undated) DEVICE — GLOVE ORANGE PI 8   MSG9080

## (undated) DEVICE — APPLICATOR PREP 26ML 0.7% IOD POVACRYLEX 74% ISO ALC ST

## (undated) DEVICE — GOWN,SIRUS,NON REINFRCD,LARGE,SET IN SL: Brand: MEDLINE

## (undated) DEVICE — SKIN AFFIX SURG ADHESIVE 72/CS 0.55ML: Brand: MEDLINE

## (undated) DEVICE — COVER ARMBRD W13XL28.5IN IMPERV BLU FOR OP RM

## (undated) DEVICE — ROYAL SILK SURGICAL GOWN, XXL: Brand: CONVERTORS